# Patient Record
Sex: FEMALE | Race: WHITE
[De-identification: names, ages, dates, MRNs, and addresses within clinical notes are randomized per-mention and may not be internally consistent; named-entity substitution may affect disease eponyms.]

---

## 2018-11-13 ENCOUNTER — RECORD ABSTRACTING (OUTPATIENT)
Age: 83
End: 2018-11-13

## 2018-11-13 DIAGNOSIS — Z87.898 PERSONAL HISTORY OF OTHER SPECIFIED CONDITIONS: ICD-10-CM

## 2018-11-13 DIAGNOSIS — M25.559 PAIN IN UNSPECIFIED HIP: ICD-10-CM

## 2018-11-13 DIAGNOSIS — E03.9 HYPOTHYROIDISM, UNSPECIFIED: ICD-10-CM

## 2018-11-13 DIAGNOSIS — E21.3 HYPERPARATHYROIDISM, UNSPECIFIED: ICD-10-CM

## 2018-11-13 PROBLEM — Z00.00 ENCOUNTER FOR PREVENTIVE HEALTH EXAMINATION: Status: ACTIVE | Noted: 2018-11-13

## 2018-11-13 RX ORDER — LEVOTHYROXINE SODIUM 0.07 MG/1
75 TABLET ORAL DAILY
Refills: 0 | Status: ACTIVE | COMMUNITY

## 2018-11-13 RX ORDER — ALPRAZOLAM 0.25 MG/1
0.25 TABLET ORAL
Refills: 0 | Status: ACTIVE | COMMUNITY

## 2018-11-13 RX ORDER — LOSARTAN POTASSIUM 25 MG/1
25 TABLET, FILM COATED ORAL DAILY
Refills: 0 | Status: ACTIVE | COMMUNITY

## 2018-11-13 RX ORDER — HYDROCHLOROTHIAZIDE 25 MG/1
25 TABLET ORAL DAILY
Refills: 0 | Status: ACTIVE | COMMUNITY

## 2018-11-13 RX ORDER — METOPROLOL SUCCINATE 50 MG/1
50 TABLET, EXTENDED RELEASE ORAL DAILY
Refills: 0 | Status: ACTIVE | COMMUNITY

## 2018-11-29 ENCOUNTER — APPOINTMENT (OUTPATIENT)
Dept: ENDOCRINOLOGY | Facility: CLINIC | Age: 83
End: 2018-11-29
Payer: MEDICARE

## 2018-11-29 VITALS
BODY MASS INDEX: 20.91 KG/M2 | SYSTOLIC BLOOD PRESSURE: 140 MMHG | HEART RATE: 70 BPM | WEIGHT: 118 LBS | DIASTOLIC BLOOD PRESSURE: 80 MMHG | HEIGHT: 63 IN

## 2018-11-29 DIAGNOSIS — M81.0 AGE-RELATED OSTEOPOROSIS W/OUT CURRENT PATHOLOGICAL FRACTURE: ICD-10-CM

## 2018-11-29 DIAGNOSIS — E55.9 VITAMIN D DEFICIENCY, UNSPECIFIED: ICD-10-CM

## 2018-11-29 DIAGNOSIS — M80.80XP OTHER OSTEOPOROSIS WITH CURRENT PATHOLOGICAL FRACTURE, UNSPECIFIED SITE, SUBSEQUENT ENCOUNTER FOR FRACTURE WITH MALUNION: ICD-10-CM

## 2018-11-29 PROCEDURE — 99213 OFFICE O/P EST LOW 20 MIN: CPT

## 2018-11-29 RX ORDER — RISEDRONATE SODIUM 129; 21 MG/1; MG/1
TABLET, FILM COATED ORAL
Refills: 0 | Status: ACTIVE | COMMUNITY

## 2019-03-20 ENCOUNTER — APPOINTMENT (OUTPATIENT)
Dept: ENDOCRINOLOGY | Facility: CLINIC | Age: 84
End: 2019-03-20

## 2019-05-21 ENCOUNTER — APPOINTMENT (OUTPATIENT)
Dept: PAIN MANAGEMENT | Facility: CLINIC | Age: 84
End: 2019-05-21
Payer: MEDICARE

## 2019-05-21 VITALS
WEIGHT: 125 LBS | BODY MASS INDEX: 22.15 KG/M2 | DIASTOLIC BLOOD PRESSURE: 80 MMHG | HEIGHT: 63 IN | SYSTOLIC BLOOD PRESSURE: 130 MMHG

## 2019-05-21 PROCEDURE — 99204 OFFICE O/P NEW MOD 45 MIN: CPT

## 2019-05-21 NOTE — ASSESSMENT
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 \par \par Discussed role of nsaids in pain management and all relevant risks, if patient is continuing to require after 4 weeks the patient should f/u for alternative treatment. \par \par Instructed patient to maintain pain diary to monitor pain level, mobility, and function.\par \par \par

## 2019-05-21 NOTE — REVIEW OF SYSTEMS
[Feeling Tired] : fatigue [Decrease Hearing] : decrease hearing [Joint Pain] : joint pain [Dizziness] : dizziness [Negative] : Heme/Lymph

## 2019-05-21 NOTE — HISTORY OF PRESENT ILLNESS
[Back Pain] : back pain [___ yrs] : [unfilled] year(s) ago [5] : a current pain level of 5/10 [6] : an average pain level of 6/10 [7] : a minimum pain level of 7/10 [8] : a maximum pain level of 8/10 [Burning] : burning [Shooting] : shooting [Electric] : electric [Standing] : standing [Bending] : bending [Laying] : laying [Walking] : walking [FreeTextEntry1] : HPI\par \par Ms. YORDAN ART is a 89 year F with pmhx of cva on asa 81mg, possible ivc filter, htn, osteoporosis, vcf L1 and L2\par \par \par Previous and current pain medications/doses/effects:\par \par Ibuprofen with mild relief\par Tylenol with mild relief\par \par Previous Pain Treatments:\par \par PT with some improvement\par \par Previous Pain Injections:\par \par Previous Diagnostic Studies/Images:\par \par XR L Shoulder 4/2019\par \par  3 views of the left shoulder fail to demonstrate any acute fracture or dislocation. There is marked  \par narrowing of the inferior aspect of the glenohumeral joint space with productive changes at the   \par caudal aspect of the glenoid and humeral head.  \par  \par  \par  IMPRESSION: Degenerative changes.   [FreeTextEntry7] : lower back pain and buttock pain over the last few years [de-identified] : Pins and needless

## 2019-05-21 NOTE — PHYSICAL EXAM
[Normal muscle bulk without asymmetry] : normal muscle bulk without asymmetry [Spinous Process Tenderness] : spinous process tenderness [Facet Tenderness] : facet tenderness [Spine: Flexion to ___ degrees, without pain] : spine: flexion to [unfilled] degrees, without pain [UE/LE] : Sensory: Intact in bilateral upper & lower extremities [ALL] : dorsalis pedis, posterior tibial, femoral, popliteal, and radial 2+ and symmetric bilaterally [Cranial Nerves Optic (II)] : visual acuity intact bilaterally,  visual fields full to confrontation, pupils equal round and reactive to light [Cranial Nerves Oculomotor (III)] : extraocular motion intact [Cranial Nerves Trigeminal (V)] : facial sensation intact symmetrically [Cranial Nerves Facial (VII)] : face symmetrical [Cranial Nerves Vestibulocochlear (VIII)] : hearing was intact bilaterally [Cranial Nerves Glossopharyngeal (IX)] : tongue and palate midline [Cranial Nerves Accessory (XI - Cranial And Spinal)] : head turning and shoulder shrug symmetric [Cranial Nerves Hypoglossal (XII)] : there was no tongue deviation with protrusion [Normal] : Normal affect

## 2019-05-23 ENCOUNTER — RESULT REVIEW (OUTPATIENT)
Age: 84
End: 2019-05-23

## 2019-06-04 ENCOUNTER — APPOINTMENT (OUTPATIENT)
Dept: PAIN MANAGEMENT | Facility: CLINIC | Age: 84
End: 2019-06-04
Payer: MEDICARE

## 2019-06-04 VITALS
HEIGHT: 63 IN | WEIGHT: 118 LBS | BODY MASS INDEX: 20.91 KG/M2 | SYSTOLIC BLOOD PRESSURE: 148 MMHG | DIASTOLIC BLOOD PRESSURE: 80 MMHG

## 2019-06-04 DIAGNOSIS — M47.817 SPONDYLOSIS W/OUT MYELOPATHY OR RADICULOPATHY, LUMBOSACRAL REGION: ICD-10-CM

## 2019-06-04 DIAGNOSIS — M48.061 SPINAL STENOSIS, LUMBAR REGION WITHOUT NEUROGENIC CLAUDICATION: ICD-10-CM

## 2019-06-04 DIAGNOSIS — M80.80XA OTHER OSTEOPOROSIS WITH CURRENT PATHOLOGICAL FRACTURE, UNSPECIFIED SITE, INITIAL ENCOUNTER FOR FRACTURE: ICD-10-CM

## 2019-06-04 DIAGNOSIS — M79.18 MYALGIA, OTHER SITE: ICD-10-CM

## 2019-06-04 PROCEDURE — 99214 OFFICE O/P EST MOD 30 MIN: CPT

## 2019-06-04 NOTE — HISTORY OF PRESENT ILLNESS
[Back Pain] : back pain [___ yrs] : [unfilled] year(s) ago [5] : a current pain level of 5/10 [6] : an average pain level of 6/10 [7] : a minimum pain level of 7/10 [8] : a maximum pain level of 8/10 [Burning] : burning [Shooting] : shooting [Electric] : electric [Standing] : standing [Bending] : bending [Laying] : laying [Walking] : walking [FreeTextEntry7] : lower back pain and buttock pain over the last few years [FreeTextEntry1] : Interval Note:\par \par Since last visit the pain is not improved. Continues to complain of pain as detailed below. Denies any additional weakness, numbness, bowel/bladder dysfunction.  Pain intensity is 5/10.  \par \par HPI\par \par Ms. YORDAN ART is a 89 year F with pmhx of cva on asa 81mg, possible ivc filter, htn, osteoporosis, vcf L1 and L2\par \par \par Previous and current pain medications/doses/effects:\par \par Ibuprofen with mild relief\par Tylenol with mild relief\par \par Previous Pain Treatments:\par \par PT with some improvement\par \par Previous Pain Injections:\par \par Previous Diagnostic Studies/Images:\par \par MRI LS 5/2019\par  There is a marked dextroscoliosis of the lumbar spine. There is mild leftward subluxation of L1 on\par L2 and L2 on L3 and rightward subluxation of L4 on L5. The lumbar lordosis is grossly maintained.\par There is degenerative grade 1 anterolisthesis of L4 on L5 and retrolisthesis of L5 on S1. The marrow\par signal is overall within normal limits with no focal marrow replacing lesion identified. There is\par mild to moderate chronic appearing compression of L1 and L2. No acute fracture is identified. There\par is degenerative endplate edema most pronounced at T12-L1, L1-2, and L5-S1. There is otherwise no\par abnormal vertebral or paraspinal edema. The visualized paraspinal soft tissues appear grossly\par unremarkable.\par \par  The conus medullaris terminates at L1-2 and the thecal sac terminates at S2. No abnormal signal is\par identified in the visualized spinal cord.\par \par  L1-2: Mild disc bulge with marginal osteophyte formation. Mild facet hypertrophy. No significant\par central canal stenosis. Mild bilateral subarticular stenosis. Severe right foraminal stenosis and\par mild left foraminal stenosis.\par \par  L2-3: Mild disc bulge with marginal osteophyte formation. Mild facet hypertrophy. No significant\par central canal or subarticular stenosis. Severe left foraminal stenosis and moderate right foraminal\par stenosis.\par \par  L3-4: Moderate disc bulge with marginal osteophyte formation. Marked facet/ligamentous hypertrophy.\par No significant central canal or subarticular stenosis. Severe left foraminal stenosis and no\par significant right foraminal stenosis.\par \par  L4-5: Grade 1 anterolisthesis of L4 on L5 with a mild disc bulge. Moderate facet arthropathy. No\par significant central canal or subarticular stenosis. Moderate left foraminal stenosis and no\par significant right foraminal stenosis.\par \par  L5-S1: Grade 1 retrolisthesis of L5 on S1. Moderate disc bulge with marginal osteophytes region. No\par significant canal stenosis. Moderate bilateral subarticular stenosis. Moderate bilateral foraminal\par stenosis.\par \par \par \par  IMPRESSION:\par \par  Lumbar scoliosis, spondylosis, and spondylolisthesis. Chronic appearing compression fractures of L1\par and L2. No significant canal stenosis. Moderate subarticular stenosis at L5-S1 bilaterally. Varying\par degrees of foraminal stenosis as above, severe at L1-S1 the right and L2-3 and L3-4 on the left.\par \par XR L Shoulder 4/2019\par \par  3 views of the left shoulder fail to demonstrate any acute fracture or dislocation. There is marked  \par narrowing of the inferior aspect of the glenohumeral joint space with productive changes at the   \par caudal aspect of the glenoid and humeral head.  \par  \par  \par  IMPRESSION: Degenerative changes.   [de-identified] : Pins and needless

## 2019-06-04 NOTE — REVIEW OF SYSTEMS
[Feeling Tired] : fatigue [Decrease Hearing] : decrease hearing [Joint Pain] : joint pain [Dizziness] : dizziness [Negative] : Constitutional

## 2019-06-04 NOTE — ASSESSMENT
[FreeTextEntry1] : I personally reviewed the relevant imaging.  Discussed and explained to patient the likely source of pathology and pain.  Questions answered.\par \par persistent pain secondary to lumbosacral stenosis demonstrated on imaging refractory to conservative treatments, will schedule caudal epidural steroid injection r/b/a discussed\par \par informed patient of risks of steroid administration including transient worsening of blood glucose, htn, mood changes, progressive osteoporosis.  Encouraged to call with questions and concerns.\par \par \par The above diagnosis and treatment plan is medically reasonable and necessary based on the patient encounter.\par \par There were no barriers to communication.\par Informed patient that I would be available for any additional questions.\par Patient was instructed to call with any worsening symptoms including severe pain, new numbness/weakness, or changes in the bowel/bladder function. \par \par Instructed patient to maintain pain diary to monitor pain level, mobility, and function.\par \par \par

## 2019-06-04 NOTE — PHYSICAL EXAM
[Normal muscle bulk without asymmetry] : normal muscle bulk without asymmetry [Spinous Process Tenderness] : spinous process tenderness [Facet Tenderness] : facet tenderness [Spine: Flexion to ___ degrees, without pain] : spine: flexion to [unfilled] degrees, without pain [UE/LE] : Motor: 5/5 motor strength in bilateral upper & lower extremities [ALL] : Biceps, brachioradialis, triceps, patellar, and achilles 2+ and symmetric bilaterally [Cranial Nerves Optic (II)] : visual acuity intact bilaterally,  visual fields full to confrontation, pupils equal round and reactive to light [Cranial Nerves Oculomotor (III)] : extraocular motion intact [Cranial Nerves Trigeminal (V)] : facial sensation intact symmetrically [Cranial Nerves Facial (VII)] : face symmetrical [Cranial Nerves Glossopharyngeal (IX)] : tongue and palate midline [Cranial Nerves Vestibulocochlear (VIII)] : hearing was intact bilaterally [Cranial Nerves Hypoglossal (XII)] : there was no tongue deviation with protrusion [Cranial Nerves Accessory (XI - Cranial And Spinal)] : head turning and shoulder shrug symmetric [Normal] : Normal affect

## 2019-06-24 ENCOUNTER — APPOINTMENT (OUTPATIENT)
Dept: PAIN MANAGEMENT | Facility: HOSPITAL | Age: 84
End: 2019-06-24

## 2019-10-28 ENCOUNTER — RECORD ABSTRACTING (OUTPATIENT)
Age: 84
End: 2019-10-28

## 2019-10-28 DIAGNOSIS — N18.3 CHRONIC KIDNEY DISEASE, STAGE 3 (MODERATE): ICD-10-CM

## 2019-10-28 DIAGNOSIS — Z87.898 PERSONAL HISTORY OF OTHER SPECIFIED CONDITIONS: ICD-10-CM

## 2019-10-28 DIAGNOSIS — Z87.891 PERSONAL HISTORY OF NICOTINE DEPENDENCE: ICD-10-CM

## 2019-10-28 DIAGNOSIS — Z82.3 FAMILY HISTORY OF STROKE: ICD-10-CM

## 2019-10-28 DIAGNOSIS — Z86.39 PERSONAL HISTORY OF OTHER ENDOCRINE, NUTRITIONAL AND METABOLIC DISEASE: ICD-10-CM

## 2019-10-28 DIAGNOSIS — Z80.9 FAMILY HISTORY OF MALIGNANT NEOPLASM, UNSPECIFIED: ICD-10-CM

## 2019-10-28 DIAGNOSIS — Z87.448 PERSONAL HISTORY OF OTHER DISEASES OF URINARY SYSTEM: ICD-10-CM

## 2019-10-28 DIAGNOSIS — Z86.79 PERSONAL HISTORY OF OTHER DISEASES OF THE CIRCULATORY SYSTEM: ICD-10-CM

## 2019-10-28 DIAGNOSIS — G56.03 CARPAL TUNNEL SYNDROM,BILATERAL UPPER LIMBS: ICD-10-CM

## 2019-10-28 DIAGNOSIS — Z86.73 PERSONAL HISTORY OF TRANSIENT ISCHEMIC ATTACK (TIA), AND CEREBRAL INFARCTION W/OUT RESIDUAL DEFICITS: ICD-10-CM

## 2019-10-28 DIAGNOSIS — Z87.442 PERSONAL HISTORY OF URINARY CALCULI: ICD-10-CM

## 2019-10-28 DIAGNOSIS — Z82.49 FAMILY HISTORY OF ISCHEMIC HEART DISEASE AND OTHER DISEASES OF THE CIRCULATORY SYSTEM: ICD-10-CM

## 2019-10-28 DIAGNOSIS — S32.000A WEDGE COMPRESSION FRACTURE OF UNSPECIFIED LUMBAR VERTEBRA, INITIAL ENCOUNTER FOR CLOSED FRACTURE: ICD-10-CM

## 2022-11-06 ENCOUNTER — TRANSCRIPTION ENCOUNTER (OUTPATIENT)
Age: 87
End: 2022-11-06